# Patient Record
Sex: FEMALE | Race: ASIAN | NOT HISPANIC OR LATINO | Employment: FULL TIME | URBAN - METROPOLITAN AREA
[De-identification: names, ages, dates, MRNs, and addresses within clinical notes are randomized per-mention and may not be internally consistent; named-entity substitution may affect disease eponyms.]

---

## 2024-03-02 ENCOUNTER — APPOINTMENT (OUTPATIENT)
Dept: RADIOLOGY | Facility: HOSPITAL | Age: 29
End: 2024-03-02
Payer: COMMERCIAL

## 2024-03-02 ENCOUNTER — HOSPITAL ENCOUNTER (EMERGENCY)
Facility: HOSPITAL | Age: 29
Discharge: HOME | End: 2024-03-02
Payer: COMMERCIAL

## 2024-03-02 VITALS
SYSTOLIC BLOOD PRESSURE: 115 MMHG | BODY MASS INDEX: 20.28 KG/M2 | DIASTOLIC BLOOD PRESSURE: 65 MMHG | OXYGEN SATURATION: 98 % | RESPIRATION RATE: 16 BRPM | TEMPERATURE: 100.2 F | HEIGHT: 57 IN | WEIGHT: 94 LBS | HEART RATE: 85 BPM

## 2024-03-02 DIAGNOSIS — N83.201 CYST OF RIGHT OVARY: Primary | ICD-10-CM

## 2024-03-02 LAB
ALBUMIN SERPL BCP-MCNC: 4.4 G/DL (ref 3.4–5)
ALP SERPL-CCNC: 45 U/L (ref 33–110)
ALT SERPL W P-5'-P-CCNC: 12 U/L (ref 7–45)
ANION GAP SERPL CALC-SCNC: 11 MMOL/L (ref 10–20)
APPEARANCE UR: CLEAR
AST SERPL W P-5'-P-CCNC: 24 U/L (ref 9–39)
B-HCG SERPL-ACNC: <2 MIU/ML
BASOPHILS # BLD AUTO: 0.02 X10*3/UL (ref 0–0.1)
BASOPHILS NFR BLD AUTO: 0.8 %
BILIRUB SERPL-MCNC: 0.3 MG/DL (ref 0–1.2)
BILIRUB UR STRIP.AUTO-MCNC: NEGATIVE MG/DL
BUN SERPL-MCNC: 12 MG/DL (ref 6–23)
CALCIUM SERPL-MCNC: 9.5 MG/DL (ref 8.6–10.3)
CHLORIDE SERPL-SCNC: 103 MMOL/L (ref 98–107)
CO2 SERPL-SCNC: 30 MMOL/L (ref 21–32)
COLOR UR: NORMAL
CREAT SERPL-MCNC: 0.9 MG/DL (ref 0.5–1.05)
EGFRCR SERPLBLD CKD-EPI 2021: 90 ML/MIN/1.73M*2
EOSINOPHIL # BLD AUTO: 0.03 X10*3/UL (ref 0–0.7)
EOSINOPHIL NFR BLD AUTO: 1.2 %
ERYTHROCYTE [DISTWIDTH] IN BLOOD BY AUTOMATED COUNT: 11.3 % (ref 11.5–14.5)
GLUCOSE SERPL-MCNC: 79 MG/DL (ref 74–99)
GLUCOSE UR STRIP.AUTO-MCNC: NEGATIVE MG/DL
HCT VFR BLD AUTO: 44.3 % (ref 36–46)
HGB BLD-MCNC: 15.3 G/DL (ref 12–16)
IMM GRANULOCYTES # BLD AUTO: 0 X10*3/UL (ref 0–0.7)
IMM GRANULOCYTES NFR BLD AUTO: 0 % (ref 0–0.9)
KETONES UR STRIP.AUTO-MCNC: NEGATIVE MG/DL
LACTATE SERPL-SCNC: 1.1 MMOL/L (ref 0.4–2)
LEUKOCYTE ESTERASE UR QL STRIP.AUTO: NEGATIVE
LIPASE SERPL-CCNC: 42 U/L (ref 9–82)
LYMPHOCYTES # BLD AUTO: 1.01 X10*3/UL (ref 1.2–4.8)
LYMPHOCYTES NFR BLD AUTO: 40.9 %
MCH RBC QN AUTO: 31 PG (ref 26–34)
MCHC RBC AUTO-ENTMCNC: 34.5 G/DL (ref 32–36)
MCV RBC AUTO: 90 FL (ref 80–100)
MONOCYTES # BLD AUTO: 0.41 X10*3/UL (ref 0.1–1)
MONOCYTES NFR BLD AUTO: 16.6 %
NEUTROPHILS # BLD AUTO: 1 X10*3/UL (ref 1.2–7.7)
NEUTROPHILS NFR BLD AUTO: 40.5 %
NITRITE UR QL STRIP.AUTO: NEGATIVE
NRBC BLD-RTO: 0 /100 WBCS (ref 0–0)
PH UR STRIP.AUTO: 7 [PH]
PLATELET # BLD AUTO: 358 X10*3/UL (ref 150–450)
POTASSIUM SERPL-SCNC: 5.2 MMOL/L (ref 3.5–5.3)
PROT SERPL-MCNC: 8.2 G/DL (ref 6.4–8.2)
PROT UR STRIP.AUTO-MCNC: NEGATIVE MG/DL
RBC # BLD AUTO: 4.94 X10*6/UL (ref 4–5.2)
RBC # UR STRIP.AUTO: NEGATIVE /UL
SODIUM SERPL-SCNC: 139 MMOL/L (ref 136–145)
SP GR UR STRIP.AUTO: 1.01
UROBILINOGEN UR STRIP.AUTO-MCNC: <2 MG/DL
WBC # BLD AUTO: 2.5 X10*3/UL (ref 4.4–11.3)

## 2024-03-02 PROCEDURE — 81003 URINALYSIS AUTO W/O SCOPE: CPT | Performed by: PHYSICIAN ASSISTANT

## 2024-03-02 PROCEDURE — 2550000001 HC RX 255 CONTRASTS: Performed by: PHYSICIAN ASSISTANT

## 2024-03-02 PROCEDURE — 84702 CHORIONIC GONADOTROPIN TEST: CPT | Performed by: PHYSICIAN ASSISTANT

## 2024-03-02 PROCEDURE — 76856 US EXAM PELVIC COMPLETE: CPT

## 2024-03-02 PROCEDURE — 74177 CT ABD & PELVIS W/CONTRAST: CPT | Mod: FOREIGN READ | Performed by: RADIOLOGY

## 2024-03-02 PROCEDURE — 36415 COLL VENOUS BLD VENIPUNCTURE: CPT | Performed by: PHYSICIAN ASSISTANT

## 2024-03-02 PROCEDURE — 85025 COMPLETE CBC W/AUTO DIFF WBC: CPT | Performed by: PHYSICIAN ASSISTANT

## 2024-03-02 PROCEDURE — 76856 US EXAM PELVIC COMPLETE: CPT | Performed by: STUDENT IN AN ORGANIZED HEALTH CARE EDUCATION/TRAINING PROGRAM

## 2024-03-02 PROCEDURE — 80053 COMPREHEN METABOLIC PANEL: CPT | Performed by: PHYSICIAN ASSISTANT

## 2024-03-02 PROCEDURE — 83690 ASSAY OF LIPASE: CPT | Performed by: PHYSICIAN ASSISTANT

## 2024-03-02 PROCEDURE — 83605 ASSAY OF LACTIC ACID: CPT | Performed by: PHYSICIAN ASSISTANT

## 2024-03-02 PROCEDURE — 76830 TRANSVAGINAL US NON-OB: CPT | Performed by: STUDENT IN AN ORGANIZED HEALTH CARE EDUCATION/TRAINING PROGRAM

## 2024-03-02 PROCEDURE — 74177 CT ABD & PELVIS W/CONTRAST: CPT

## 2024-03-02 PROCEDURE — 99285 EMERGENCY DEPT VISIT HI MDM: CPT | Mod: 25 | Performed by: PHYSICIAN ASSISTANT

## 2024-03-02 RX ORDER — NAPROXEN 500 MG/1
500 TABLET ORAL
Qty: 30 TABLET | Refills: 0 | Status: SHIPPED | OUTPATIENT
Start: 2024-03-02 | End: 2024-03-17

## 2024-03-02 RX ADMIN — IOHEXOL 75 ML: 350 INJECTION, SOLUTION INTRAVENOUS at 20:08

## 2024-03-02 ASSESSMENT — LIFESTYLE VARIABLES
EVER HAD A DRINK FIRST THING IN THE MORNING TO STEADY YOUR NERVES TO GET RID OF A HANGOVER: NO
HAVE PEOPLE ANNOYED YOU BY CRITICIZING YOUR DRINKING: NO
HAVE YOU EVER FELT YOU SHOULD CUT DOWN ON YOUR DRINKING: NO
EVER FELT BAD OR GUILTY ABOUT YOUR DRINKING: NO

## 2024-03-02 ASSESSMENT — PAIN - FUNCTIONAL ASSESSMENT: PAIN_FUNCTIONAL_ASSESSMENT: 0-10

## 2024-03-02 ASSESSMENT — COLUMBIA-SUICIDE SEVERITY RATING SCALE - C-SSRS
2. HAVE YOU ACTUALLY HAD ANY THOUGHTS OF KILLING YOURSELF?: NO
6. HAVE YOU EVER DONE ANYTHING, STARTED TO DO ANYTHING, OR PREPARED TO DO ANYTHING TO END YOUR LIFE?: NO
1. IN THE PAST MONTH, HAVE YOU WISHED YOU WERE DEAD OR WISHED YOU COULD GO TO SLEEP AND NOT WAKE UP?: NO

## 2024-03-03 LAB — HOLD SPECIMEN: NORMAL

## 2024-03-03 NOTE — ED PROVIDER NOTES
EMERGENCY MEDICINE EVALUATION NOTE    History of Present Illness     Chief Complaint:   Chief Complaint   Patient presents with    Abdominal Cramping     RLQ abdominal cramping since Monday       HPI: Anne Lehman is a 28 y.o. female presents with a chief complaint of right lower quadrant abdominal pain.  Patient says she has had pain now for about 5 or 6 days.  She states that the pain in the week which is cramping in nature but is gotten more intense over the last day or 2.  Patient reports that it is like a 5 or 6 out of 10.  Patient reports that she has a history of a ruptured ovarian cyst which she believes was on the right side but this pain does not feel the same.  Patient denies any previous abdominal surgeries.  She denies any significant nausea or vomiting.  Patient denies any urinary symptoms or vaginal bleeding.  Patient denies any change of bowel habits.  Patient denies any chance of pregnancy.  She denies any significant past medical history or medication allergies.    Previous History   No past medical history on file.  No past surgical history on file.     No family history on file.  Allergies   Allergen Reactions    Banana Swelling     Of throat     No current outpatient medications    Physical Exam     Appearance: Alert, oriented , cooperative     Skin: Intact,  dry skin, no lesions, rash, petechiae or purpura.      Eyes: PERRLA, EOMs intact,  Conjunctiva pink      ENT: Hearing grossly intact. Pharynx clear, uvula midline.      Neck: Supple. Trachea at midline.      Pulmonary: Clear bilaterally. No rales, rhonchi or wheezing. No accessory muscle use or stridor.     Cardiac: Normal rate and rhythm without murmur     Abdomen: Soft, active bowel sounds.  Right lower quadrant abdominal tenderness, guarding or rebound.  No signs of peritonitis.     Musculoskeletal: Full range of motion.      Neurological:Cranial nerves II through XII are grossly intact, normal sensation, no weakness, no focal  "findings identified.     Results     Labs Reviewed   CBC WITH AUTO DIFFERENTIAL - Abnormal       Result Value    WBC 2.5 (*)     nRBC 0.0      RBC 4.94      Hemoglobin 15.3      Hematocrit 44.3      MCV 90      MCH 31.0      MCHC 34.5      RDW 11.3 (*)     Platelets 358      Neutrophils % 40.5      Immature Granulocytes %, Automated 0.0      Lymphocytes % 40.9      Monocytes % 16.6      Eosinophils % 1.2      Basophils % 0.8      Neutrophils Absolute 1.00 (*)     Immature Granulocytes Absolute, Automated 0.00      Lymphocytes Absolute 1.01 (*)     Monocytes Absolute 0.41      Eosinophils Absolute 0.03      Basophils Absolute 0.02     COMPREHENSIVE METABOLIC PANEL   LIPASE   LACTATE   URINALYSIS WITH REFLEX CULTURE AND MICROSCOPIC    Narrative:     The following orders were created for panel order Urinalysis with Reflex Culture and Microscopic.  Procedure                               Abnormality         Status                     ---------                               -----------         ------                     Urinalysis with Reflex C...[[id number]]                      In process                 Extra Urine Gray Tube[[id number]]                            In process                   Please view results for these tests on the individual orders.   HUMAN CHORIONIC GONADOTROPIN, SERUM QUANTITATIVE   URINALYSIS WITH REFLEX CULTURE AND MICROSCOPIC   EXTRA URINE GRAY TUBE     CT abdomen pelvis w IV contrast    (Results Pending)         ED Course & Medical Decision Making   Medications - No data to display  Heart Rate:  [94]   Temperature:  [37.9 °C (100.2 °F)]   Respirations:  [16]   BP: (126)/(65)   Height:  [144.8 cm (4' 9\")]   Weight:  [42.6 kg (94 lb)]   Pulse Ox:  [99 %]       20:00  Patient signed out to oncoming provider at shift change pending reevaluation.  Please see ongoing provider admission.    Procedures   Procedures    Diagnosis   No diagnosis found.    Disposition   Signed out pending " reevaluation    ED Prescriptions    None         Disclaimer: This note was dictated by speech recognition. Minor errors in transcription may be present. Please call if questions.       Jackson Ramírez PA-C  03/02/24 [id number]

## 2024-03-03 NOTE — ED PROVIDER NOTES
Patient was handed off to me by the previous provider.  At the time of handoff CT imaging was pending.  Patient presented today reporting discomfort to her right lower quadrant/pelvic region.  Blood work was obtained revealing a leukopenia of 2.5.  Hemoglobin stable at 15.3.  Lactate level within normal limits at 1.1.  Lipase 42.  Quantitative hCG negative.  No evidence of transaminitis or elevation of the serum creatinine.  CT imaging revealed no acute inflammatory changes with a normal appearance of the appendix.  The patient was found to have a right ovarian cyst along with a small amount of free pelvic fluid which is likely physiologic in nature.  Given this finding an ultrasound was obtained to assess for ovarian torsion which was found to be negative for acute torsion.  Patient was notified of the findings and verbalized understanding.  She will be discharged home on naproxen.  Recommended following up with her OB/GYN.     Jose Sahu PA-C  03/02/24 [id number]

## 2024-08-01 ENCOUNTER — OFFICE VISIT (OUTPATIENT)
Dept: CARDIOLOGY | Facility: CLINIC | Age: 29
End: 2024-08-01
Payer: COMMERCIAL

## 2024-08-01 VITALS
DIASTOLIC BLOOD PRESSURE: 65 MMHG | SYSTOLIC BLOOD PRESSURE: 102 MMHG | HEART RATE: 61 BPM | BODY MASS INDEX: 20.56 KG/M2 | OXYGEN SATURATION: 99 % | WEIGHT: 95 LBS

## 2024-08-01 DIAGNOSIS — R07.9 CHEST PAIN, CENTRAL: ICD-10-CM

## 2024-08-01 DIAGNOSIS — R94.31 ABNORMAL EKG: Primary | ICD-10-CM

## 2024-08-01 RX ORDER — HYDROCODONE BITARTRATE AND ACETAMINOPHEN 5; 325 MG/1; MG/1
TABLET ORAL EVERY 6 HOURS
COMMUNITY
Start: 2019-12-05 | End: 2024-08-01 | Stop reason: WASHOUT

## 2024-08-01 RX ORDER — SPIRONOLACTONE 50 MG/1
1 TABLET, FILM COATED ORAL
COMMUNITY
Start: 2024-06-25 | End: 2024-08-01 | Stop reason: WASHOUT

## 2024-08-01 RX ORDER — EPINEPHRINE 0.3 MG/.3ML
0.3 INJECTION SUBCUTANEOUS
COMMUNITY
Start: 2023-09-28

## 2024-08-01 RX ORDER — CLINDAMYCIN PHOSPHATE 10 UG/ML
LOTION TOPICAL
COMMUNITY
Start: 2024-04-22

## 2024-08-01 NOTE — PROGRESS NOTES
Referred by Dr. Thornton ref. provider found for New Patient Visit (Patient is here as a new patient)     History Of Present Illness:    Anne Lehman is a 28 y.o. female with no hz of CV dz presenting with chest pain.  Since last year has been having chest pain.Has been more frequent recently.  Pain is on L side of chest-described as tightness/squeezing.Can last up to 1 hour.  Sometimes associated with numbness in L arm.More common at rest-random.  Denies SOB/palpitations/dizziness/LH/syncope/edema/claudication.    Active-weight lifts -no symptoms then        Past Medical History:  None    Past Surgical History:  None      Social History:  She reports that she has never smoked. She has never used smokeless tobacco. She reports that she does not drink alcohol and does not use drugs.    Family History:  No family hz of CAD     Allergies:  Banana    Outpatient Medications:  Current Outpatient Medications   Medication Instructions    clindamycin (Cleocin T) 1 % lotion APPLY TO FACE TWICE A DAY    EPINEPHrine (EPIPEN) 0.3 mg, intramuscular        Last Recorded Vitals:  Vitals:    08/01/24 1458   BP: 102/63   BP Location: Left arm   Patient Position: Sitting   BP Cuff Size: Adult   Pulse: 61   SpO2: 99%   Weight: (!) 43.1 kg (95 lb)       Physical Exam:  Constitutional:       General: Awake.      Appearance: Healthy appearance. Underweight and not in distress.   Neck:      Vascular: No JVR. JVD normal.   Pulmonary:      Effort: Pulmonary effort is normal.      Breath sounds: Normal breath sounds. No wheezing. No rhonchi. No rales.   Chest:      Chest wall: Not tender to palpatation.   Cardiovascular:      PMI at left midclavicular line. Normal rate. Regular rhythm. Normal S1. Normal S2.       Murmurs: There is no murmur.      No gallop.  No click. No rub.   Pulses:     Intact distal pulses.   Edema:     Peripheral edema absent.   Abdominal:      General: Bowel sounds are normal.      Palpations: Abdomen is soft.       "Tenderness: There is no abdominal tenderness.   Musculoskeletal: Normal range of motion.         General: No tenderness. Skin:     General: Skin is warm and dry.   Neurological:      General: No focal deficit present.      Mental Status: Alert and oriented to person, place and time.            Last Labs:  CBC -  Lab Results   Component Value Date    WBC 2.5 (L) 03/02/2024    HGB 15.3 03/02/2024    HCT 44.3 03/02/2024    MCV 90 03/02/2024     03/02/2024       CMP -  Lab Results   Component Value Date    CALCIUM 9.5 03/02/2024    PROT 8.2 03/02/2024    ALBUMIN 4.4 03/02/2024    AST 24 03/02/2024    ALT 12 03/02/2024    ALKPHOS 45 03/02/2024    BILITOT 0.3 03/02/2024       LIPID PANEL -   No results found for: \"CHOL\", \"TRIG\", \"HDL\", \"CHHDL\", \"LDLF\", \"VLDL\", \"LDLDIRECT\"    RENAL FUNCTION PANEL -   Lab Results   Component Value Date    GLUCOSE 79 03/02/2024     03/02/2024    K 5.2 03/02/2024     03/02/2024    CO2 30 03/02/2024    ANIONGAP 11 03/02/2024    BUN 12 03/02/2024    CREATININE 0.90 03/02/2024    CALCIUM 9.5 03/02/2024    ALBUMIN 4.4 03/02/2024        No results found for: \"BNP\", \"HGBA1C\"    Last Cardiology Tests:  ECG:  Normal sinus rhythm  Right superior axis deviation  Possible RVH    Echo:  No results found for this or any previous visit from the past 1095 days.      Ejection Fractions:  No results found for: \"EF\"    Cath:  No results found for this or any previous visit from the past 1095 days.      Stress Test:  No results found for this or any previous visit from the past 1095 days.    Cardiac Imaging:  No results found for this or any previous visit from the past 1095 days.        Lab review: I have personally reviewed the laboratory result(s)     Assessment/Plan   Problem List Items Addressed This Visit       Chest pain, central    Overview     Suspect non cardiac etiology  May be MS-lift weights,suggest decreasing weight lifting  No ischemic EKG changes  Check echo         Abnormal " EKG - Primary    Overview     Possible RVH  Check echo         Relevant Orders    ECG 12 Lead (Completed)          Echo=cp  Jun Ornelas, DO

## 2024-08-26 ENCOUNTER — APPOINTMENT (OUTPATIENT)
Dept: CARDIOLOGY | Facility: CLINIC | Age: 29
End: 2024-08-26
Payer: COMMERCIAL

## 2024-09-09 ENCOUNTER — APPOINTMENT (OUTPATIENT)
Dept: CARDIOLOGY | Facility: CLINIC | Age: 29
End: 2024-09-09
Payer: COMMERCIAL

## 2024-09-17 ENCOUNTER — HOSPITAL ENCOUNTER (OUTPATIENT)
Dept: CARDIOLOGY | Facility: CLINIC | Age: 29
Discharge: HOME | End: 2024-09-17
Payer: COMMERCIAL

## 2024-09-17 VITALS — BODY MASS INDEX: 20.56 KG/M2 | WEIGHT: 95 LBS

## 2024-09-17 DIAGNOSIS — R07.9 CHEST PAIN, CENTRAL: ICD-10-CM

## 2024-09-17 DIAGNOSIS — R94.31 ABNORMAL EKG: ICD-10-CM

## 2024-09-17 PROCEDURE — 93306 TTE W/DOPPLER COMPLETE: CPT

## 2024-09-17 PROCEDURE — 93306 TTE W/DOPPLER COMPLETE: CPT | Performed by: INTERNAL MEDICINE

## 2024-09-18 LAB
AORTIC VALVE MEAN GRADIENT: 2.5 MMHG
AORTIC VALVE PEAK VELOCITY: 1.16 M/S
AV PEAK GRADIENT: 5.3 MMHG
AVA (PEAK VEL): 2.25 CM2
AVA (VTI): 2.18 CM2
EJECTION FRACTION APICAL 4 CHAMBER: 56.8
EJECTION FRACTION: 59 %
LEFT VENTRICLE INTERNAL DIMENSION DIASTOLE: 4.07 CM (ref 3.5–6)
LEFT VENTRICULAR OUTFLOW TRACT DIAMETER: 1.98 CM
MITRAL VALVE E/A RATIO: 1.84
RIGHT VENTRICLE FREE WALL PEAK S': 14 CM/S
RIGHT VENTRICLE PEAK SYSTOLIC PRESSURE: 25.4 MMHG
TRICUSPID ANNULAR PLANE SYSTOLIC EXCURSION: 2.8 CM